# Patient Record
Sex: MALE
[De-identification: names, ages, dates, MRNs, and addresses within clinical notes are randomized per-mention and may not be internally consistent; named-entity substitution may affect disease eponyms.]

---

## 2022-10-05 ENCOUNTER — DASHBOARD ENCOUNTERS (OUTPATIENT)
Age: 52
End: 2022-10-05

## 2022-10-05 ENCOUNTER — OFFICE VISIT (OUTPATIENT)
Dept: URBAN - METROPOLITAN AREA CLINIC 105 | Facility: CLINIC | Age: 52
End: 2022-10-05
Payer: COMMERCIAL

## 2022-10-05 DIAGNOSIS — R79.89 ABNORMAL LFTS: ICD-10-CM

## 2022-10-05 PROCEDURE — 99242 OFF/OP CONSLTJ NEW/EST SF 20: CPT | Performed by: INTERNAL MEDICINE

## 2022-10-05 NOTE — HPI-TODAY'S VISIT:
Pt comes on rerferral from Dr. Karlene Iniguez. a copy of this consultation will be sent to the referring physician.  Patient comes for evaluation of abnormally elevated AST and ALT.  I am able to review laboratory were AST and ALT were in the 70-80 range.  Bilirubin and alkaline phosphatase are normal.  He is not a consumer of alcohol and does not take over-the-counter herbal preparations.  He also has no family history of any GI or liver disease.  It is important to note that he does work out and lifts weights.  He does strenuous resistance training and has done so for several many years.

## 2022-10-06 ENCOUNTER — TELEPHONE ENCOUNTER (OUTPATIENT)
Dept: URBAN - METROPOLITAN AREA CLINIC 105 | Facility: CLINIC | Age: 52
End: 2022-10-06

## 2022-10-06 LAB
ALBUMIN/GLOBULIN RATIO: 1.9
ALBUMIN: 4.3
ALKALINE PHOSPHATASE: 54
ALT (SGPT): 59
AST (SGOT): 60
BILIRUBIN, DIRECT: 0.1
BILIRUBIN, INDIRECT: 0.6
BILIRUBIN, TOTAL: 0.7
CREATINE KINASE,TOTAL: 1345
GLOBULIN: 2.3
PROTEIN, TOTAL: 6.6